# Patient Record
Sex: MALE | Race: WHITE | NOT HISPANIC OR LATINO | URBAN - METROPOLITAN AREA
[De-identification: names, ages, dates, MRNs, and addresses within clinical notes are randomized per-mention and may not be internally consistent; named-entity substitution may affect disease eponyms.]

---
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
## 2021-05-30 VITALS — BODY MASS INDEX: 24.62 KG/M2 | WEIGHT: 181.8 LBS | HEIGHT: 72 IN

## 2021-06-01 VITALS — WEIGHT: 188.1 LBS | BODY MASS INDEX: 25.51 KG/M2

## 2021-06-09 NOTE — PROGRESS NOTES
Assessment:      Satisfactory school sports physical exam.     Recommend Menactra booster and HPV vaccines, but he declines.   Plan:      Permission granted to participate in athletics without restrictions. Form signed and returned to patient.  Anticipatory guidance: Specific topics reviewed: bicycle helmets, breast self-exam, drugs, ETOH, and tobacco, importance of regular dental care, importance of regular exercise, importance of varied diet, limit TV, media violence, minimize junk food, seat belts, sex; STD and pregnancy prevention and testicular self-exam.     Subjective:       Shaun Puga is a 17 y.o. male who presents for a school sports physical exam. Patient/parent deny any current health related concerns.  He plans to participate in lacrosse.    Immunization History   Administered Date(s) Administered     DTaP, historic 1999, 1999, 1999, 05/02/2000, 04/23/2004     Hep A, historic 08/11/2008, 02/25/2009     Hep B, historic 1999, 1999, 05/02/2000     HiB, historic 1999, 1999, 05/02/2000     IPV 1999, 1999, 01/18/2000, 04/23/2004     Influenza, inj, historic 10/16/2008     Influenza, seasonal,quad inj 6-35 mos 03/04/2011     MMR 01/18/2000, 04/23/2004     Meningococcal MCV4P 03/04/2011     Rotavirus, pentavalent 1999, 1999, 1999     Tdap 03/04/2011     Varicella 01/18/2000, 10/16/2008       The following portions of the patient's history were reviewed and updated as appropriate: allergies, current medications, past family history, past medical history, past social history, past surgical history and problem list.    Review of Systems  Pertinent items are noted in HPI      Objective:        Visit Vitals     /68 (Patient Site: Left Arm, Patient Position: Sitting, Cuff Size: Adult Large)     Pulse (!) 46     Ht 6' (1.829 m)     Wt 181 lb 12.8 oz (82.5 kg)     SpO2 100%     BMI 24.66 kg/m2       General Appearance:  Alert,  cooperative, no distress, appropriate for age                             Head:  Normocephalic, no obvious abnormality                              Eyes:  PERRL, EOM's intact, conjunctiva and corneas clear, fundi benign, both eyes                              Nose:  Nares symmetrical, septum midline, mucosa pink, clear watery discharge; no sinus tenderness                           Throat:  Lips, tongue, and mucosa are moist, pink, and intact; teeth intact                              Neck:  Supple, symmetrical, trachea midline, no adenopathy; thyroid: no enlargement, symmetric,no tenderness/mass/nodules; no carotid bruit, no JVD                              Back:  Symmetrical, no curvature, ROM normal, no CVA tenderness                Chest/Breast:  No mass or tenderness                            Lungs:  Clear to auscultation bilaterally, respirations unlabored                              Heart:  Normal PMI, regular rate & rhythm, S1 and S2 normal, no murmurs, rubs, or gallops                      Abdomen:  Soft, non-tender, bowel sounds active all four quadrants, no mass, or organomegaly               Genitourinary:  Deferred per patient          Musculoskeletal:  Tone and strength strong and symmetrical, all extremities                     Lymphatic:  No adenopathy             Skin/Hair/Nails:  Skin warm, dry, and intact, no rashes or abnormal dyspigmentation                   Neurologic:  Alert and oriented x3, no cranial nerve deficits, normal strength and tone, gait steady

## 2021-06-19 NOTE — PROGRESS NOTES
Assessment:     1. Left-sided low back pain with left-sided sciatica         Plan:     1. 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  He is a left-handed shooter and otherwise is ambidextrous.  He is also a left-handed hockey shooter.  Patient has a musculoskeletal thin body habitus with marked muscular definition.  I can appreciate his predominance of his thoracolumbar spine more on the left side than on the right I do not feel any other investigation is indicated at this time as patient is generally asymptomatic.  He was checked in the lower lumbar left-sided area about 3 months ago he did sustain some musculoskeletal pain in that area but has not had any hematuria or other problems and has continued to play lacrosse he was concerned because when he palpates that area is more prominent on the left than on the right.  I feel her explanation is adequate and en pointe at this time we will follow the patient if he notices any other swelling symptomatology or neurological symptoms or musculoskeletal problems he is to let us know otherwise I think he may go about his activities unlimited especially his sporting activities.  Consequently he does play some golf and this does not bother him in any way shape or form.  Pleasure to meet this young man.    Review of Systems: A complete 14 point review of systems was obtained and is negative or as stated  in the history of present illness.    No past medical history on file.  Family History   Problem Relation Age of Onset     No Medical Problems Mother      No Medical Problems Father      Past Surgical History:   Procedure Laterality Date     NH REMOVE TONSILS/ADENOIDS,<13 Y/O      Description: Tonsillectomy With Adenoidectomy;  Recorded: 08/26/2013;     Social History   Substance Use Topics     Smoking status: Never Smoker     Smokeless tobacco: Never Used     Alcohol use No         Objective:   /72 (Patient Site: Left Arm, Patient Position: Sitting, Cuff Size: Adult Regular)  Pulse (!) 52  Wt 188 lb 1.6 oz (85.3 kg)  BMI 25.51 kg/m2    General Appearance:  Alert, cooperative, no distress  Head:  Normocephalic, no obvious abnormality  Ears: TM anatomy normal  Eyes:  PERRL, EOM's intact, conjunctiva and corneas clear  Nose:  Nares symmetrical, septum midline, mucosa pink, no sinus tenderness  Throat:  Lips, tongue, and mucosa are moist, pink, and intact  Neck:  Supple, symmetrical, trachea midline, no adenopathy; thyroid: no enlargement, symmetric,no tenderness/mass/nodules; no carotid bruit, no JVD  Back:  Symmetrical, no curvature, ROM normal, no CVA tenderness  Chest/Breast:  No mass or tenderness  Lungs:  Clear to auscultation bilaterally, respirations unlabored   Heart:  Normal PMI, regular rate & rhythm, S1 and S2 normal, no murmurs, rubs, or gallops  Abdomen:  Soft, non-tender, bowel sounds active all four quadrants, no mass, or organomegaly  Musculoskeletal:  Tone and strength strong and symmetrical, all extremities patient is left-sided thoracolumbar paravertebral muscle development is more advanced on the left than on the right because of his left-handed lacrosse and hockey playing.  No evidence of other disease spine is straight no lateralizing signs no neurological signs  Lymphatic:  No adenopathy  Skin/Hair/Nails:  Skin warm, dry, and intact, no rashes  Neurologic:  Alert and oriented x3, no  cranial nerve deficits, normal strength and tone, gait steady  Extremities:  No edema.  Jairo's sign negative.    Genitourinary: deferred  Pulses:  Equal bilaterally           This note has been dictated using voice recognition software. Any grammatical or context distortions are unintentional and inherent to the the software.